# Patient Record
Sex: FEMALE | Race: WHITE | Employment: OTHER | URBAN - METROPOLITAN AREA
[De-identification: names, ages, dates, MRNs, and addresses within clinical notes are randomized per-mention and may not be internally consistent; named-entity substitution may affect disease eponyms.]

---

## 2017-03-29 PROBLEM — D63.8 ANEMIA OF CHRONIC DISEASE: Status: ACTIVE | Noted: 2017-03-29

## 2017-03-29 PROBLEM — Z09 FOLLOW-UP EXAMINATION: Status: ACTIVE | Noted: 2017-03-29

## 2018-06-07 ENCOUNTER — HOSPITAL ENCOUNTER (OUTPATIENT)
Dept: WOMENS IMAGING | Age: 78
Discharge: HOME | End: 2018-06-07
Attending: SURGERY | Admitting: SURGERY

## 2018-06-07 DIAGNOSIS — Z12.31 VISIT FOR SCREENING MAMMOGRAM: ICD-10-CM

## 2018-09-26 PROBLEM — Z09 FOLLOW-UP EXAMINATION: Status: RESOLVED | Noted: 2017-03-29 | Resolved: 2018-09-26

## 2019-12-03 ENCOUNTER — OFFICE VISIT (OUTPATIENT)
Dept: CARDIOLOGY CLINIC | Age: 79
End: 2019-12-03
Payer: MEDICARE

## 2019-12-03 VITALS
SYSTOLIC BLOOD PRESSURE: 135 MMHG | WEIGHT: 182.4 LBS | DIASTOLIC BLOOD PRESSURE: 80 MMHG | HEART RATE: 82 BPM | BODY MASS INDEX: 31.14 KG/M2 | HEIGHT: 64 IN | OXYGEN SATURATION: 97 %

## 2019-12-03 DIAGNOSIS — I25.119 CORONARY ARTERY DISEASE INVOLVING NATIVE CORONARY ARTERY OF NATIVE HEART WITH ANGINA PECTORIS (HCC): Primary | ICD-10-CM

## 2019-12-03 DIAGNOSIS — I50.22 CHRONIC SYSTOLIC CONGESTIVE HEART FAILURE (HCC): ICD-10-CM

## 2019-12-03 DIAGNOSIS — E78.00 PURE HYPERCHOLESTEROLEMIA: ICD-10-CM

## 2019-12-03 DIAGNOSIS — R07.89 CHEST PRESSURE: ICD-10-CM

## 2019-12-03 DIAGNOSIS — I42.9 CARDIOMYOPATHY, UNSPECIFIED TYPE (HCC): ICD-10-CM

## 2019-12-03 PROCEDURE — G8484 FLU IMMUNIZE NO ADMIN: HCPCS | Performed by: INTERNAL MEDICINE

## 2019-12-03 PROCEDURE — G8599 NO ASA/ANTIPLAT THER USE RNG: HCPCS | Performed by: INTERNAL MEDICINE

## 2019-12-03 PROCEDURE — G8400 PT W/DXA NO RESULTS DOC: HCPCS | Performed by: INTERNAL MEDICINE

## 2019-12-03 PROCEDURE — 1090F PRES/ABSN URINE INCON ASSESS: CPT | Performed by: INTERNAL MEDICINE

## 2019-12-03 PROCEDURE — G8417 CALC BMI ABV UP PARAM F/U: HCPCS | Performed by: INTERNAL MEDICINE

## 2019-12-03 PROCEDURE — 99203 OFFICE O/P NEW LOW 30 MIN: CPT | Performed by: INTERNAL MEDICINE

## 2019-12-03 PROCEDURE — 4040F PNEUMOC VAC/ADMIN/RCVD: CPT | Performed by: INTERNAL MEDICINE

## 2019-12-03 PROCEDURE — 93000 ELECTROCARDIOGRAM COMPLETE: CPT | Performed by: INTERNAL MEDICINE

## 2019-12-03 PROCEDURE — 1036F TOBACCO NON-USER: CPT | Performed by: INTERNAL MEDICINE

## 2019-12-03 PROCEDURE — 1123F ACP DISCUSS/DSCN MKR DOCD: CPT | Performed by: INTERNAL MEDICINE

## 2019-12-03 PROCEDURE — G8427 DOCREV CUR MEDS BY ELIG CLIN: HCPCS | Performed by: INTERNAL MEDICINE

## 2019-12-03 RX ORDER — OMEPRAZOLE 20 MG/1
20 CAPSULE, DELAYED RELEASE ORAL DAILY
COMMUNITY

## 2019-12-03 RX ORDER — OLMESARTAN MEDOXOMIL AND HYDROCHLOROTHIAZIDE 40/12.5 40; 12.5 MG/1; MG/1
1 TABLET ORAL DAILY
COMMUNITY

## 2019-12-03 SDOH — HEALTH STABILITY: MENTAL HEALTH: HOW OFTEN DO YOU HAVE A DRINK CONTAINING ALCOHOL?: NEVER

## 2019-12-03 ASSESSMENT — ENCOUNTER SYMPTOMS
SHORTNESS OF BREATH: 1
NAUSEA: 0
EYE REDNESS: 0
COUGH: 0
BLOOD IN STOOL: 0
WHEEZING: 0

## 2019-12-13 ENCOUNTER — TELEPHONE (OUTPATIENT)
Dept: CARDIOLOGY CLINIC | Age: 79
End: 2019-12-13

## 2020-01-08 ENCOUNTER — OFFICE VISIT (OUTPATIENT)
Dept: CARDIOLOGY CLINIC | Age: 80
End: 2020-01-08
Payer: COMMERCIAL

## 2020-01-08 VITALS
DIASTOLIC BLOOD PRESSURE: 80 MMHG | HEIGHT: 64 IN | HEART RATE: 80 BPM | WEIGHT: 191.6 LBS | SYSTOLIC BLOOD PRESSURE: 130 MMHG | BODY MASS INDEX: 32.71 KG/M2 | OXYGEN SATURATION: 98 %

## 2020-01-08 PROCEDURE — 99214 OFFICE O/P EST MOD 30 MIN: CPT | Performed by: INTERNAL MEDICINE

## 2020-01-08 RX ORDER — ATORVASTATIN CALCIUM 40 MG/1
40 TABLET, FILM COATED ORAL DAILY
Qty: 90 TABLET | Refills: 3 | Status: SHIPPED | OUTPATIENT
Start: 2020-01-08 | End: 2020-01-21 | Stop reason: SINTOL

## 2020-01-08 RX ORDER — ASPIRIN 81 MG/1
81 TABLET ORAL DAILY
Qty: 90 TABLET | Refills: 1 | Status: CANCELLED
Start: 2020-01-08

## 2020-01-08 ASSESSMENT — ENCOUNTER SYMPTOMS
WHEEZING: 0
EYE REDNESS: 0
COUGH: 0
SHORTNESS OF BREATH: 0
BLOOD IN STOOL: 0
NAUSEA: 0

## 2020-01-08 NOTE — PATIENT INSTRUCTIONS
Patient Education      START ATORVASTATIN 40MG NIGHTLY  CHECK WITH RHEUMATOLOGIST TO SEE IF YOU CAN TAKE ASPIRIN 81MG DAILY  High Cholesterol: Care Instructions  Your Care Instructions    Cholesterol is a type of fat in your blood. It is needed for many body functions, such as making new cells. Cholesterol is made by your body. It also comes from food you eat. High cholesterol means that you have too much of the fat in your blood. This raises your risk of a heart attack and stroke. LDL and HDL are part of your total cholesterol. LDL is the \"bad\" cholesterol. High LDL can raise your risk for heart disease, heart attack, and stroke. HDL is the \"good\" cholesterol. It helps clear bad cholesterol from the body. High HDL is linked with a lower risk of heart disease, heart attack, and stroke. Your cholesterol levels help your doctor find out your risk for having a heart attack or stroke. You and your doctor can talk about whether you need to lower your risk and what treatment is best for you. A heart-healthy lifestyle along with medicines can help lower your cholesterol and your risk. The way you choose to lower your risk will depend on how high your risk is for heart attack and stroke. It will also depend on how you feel about taking medicines. Follow-up care is a key part of your treatment and safety. Be sure to make and go to all appointments, and call your doctor if you are having problems. It's also a good idea to know your test results and keep a list of the medicines you take. How can you care for yourself at home? · Eat a variety of foods every day. Good choices include fruits, vegetables, whole grains (like oatmeal), dried beans and peas, nuts and seeds, soy products (like tofu), and fat-free or low-fat dairy products. · Replace butter, margarine, and hydrogenated or partially hydrogenated oils with olive and canola oils.  (Canola oil margarine without trans fat is fine.)  · Replace red meat with fish,

## 2020-01-08 NOTE — LETTER
[address] [facility] [place] [address] [address]  [address]  Phone: [phone]  Fax: [phone]    Fernando Brooks MD        January 24, 2020     Amber Díaz, [address] [place] [address]    Patient: Lukas Erwin  MR Number: <[id number]>  YOB: 1940  Date of Visit: 1/8/2020    Dear Dr. Amber Díaz:    Thank you for your referral. Progress note attached in visit summary. If you have questions, please do not hesitate to call me. I look forward to following Smita Arvizu along with you.     Sincerely,        Fernando Brooks MD

## 2020-01-08 NOTE — PROGRESS NOTES
Manuelito Ledezma is a 78 y.o. female    Reason for Visit: f/u stress test    HPI Manuelito Ledezma denies any change in her chronic angina described as a pressure, palpitations, syncope, worsening BOSS, worsening leg swelling, and PND. Her weight is up 9 pounds from last visit which she attributes to eating too much over the holidays. She ambulates with a cane. Review of Systems   Constitutional: Negative for chills, diaphoresis, fatigue and fever. HENT: Negative for congestion, nosebleeds and tinnitus. Eyes: Negative for redness. Respiratory: Negative for cough, shortness of breath and wheezing. Cardiovascular: Negative for chest pain, palpitations and leg swelling. Gastrointestinal: Negative for blood in stool and nausea. Genitourinary: Negative for dysuria and hematuria. Musculoskeletal: Negative for myalgias and neck pain. Neurological: Negative for dizziness. Hematological: Does not bruise/bleed easily. Physical Exam  Vitals signs and nursing note reviewed. Constitutional:       Appearance: She is well-developed. HENT:      Head: Normocephalic and atraumatic. Eyes:      Conjunctiva/sclera: Conjunctivae normal.   Neck:      Musculoskeletal: Normal range of motion and neck supple. Cardiovascular:      Rate and Rhythm: Normal rate and regular rhythm. Heart sounds: S1 normal and S2 normal. Murmur (2/6 systolic) present. No gallop. Pulmonary:      Effort: Pulmonary effort is normal.      Breath sounds: Normal breath sounds. Abdominal:      General: Bowel sounds are normal.      Palpations: Abdomen is soft. Musculoskeletal: Normal range of motion. General: No swelling. Skin:     General: Skin is warm and dry. Neurological:      Mental Status: She is alert and oriented to person, place, and time.    Psychiatric:         Behavior: Behavior normal.       Wt Readings from Last 3 Encounters:   01/08/20 191 lb 9.6 oz (86.9 kg)   12/03/19 182 lb 6.4 oz (82.7 kg) Anemia. H/H 10.7, 29.0.     *   HLD. Not taking statin   12/2019. *   Chest pressure/BOSS. Pt reports chronic exertional angina. Worsening dyspnea make be angina equivalent. *   Valvular heart disease. See echo above. Plan  Stress test showed no ischemia. Stable angina. CHF appears to be compensated. HTN controlled. Continue ARB, HCTZ, nitrates, norvasc and BB. Will start atorvastatin 40mg nightly. Recommend ASA 81mg daily if OK with rheumatologist. Fasting lipid profile, CMP, and proBNP in 3 months. Consider angiogram is symptoms worsen. Return in about 3 months (around 4/8/2020). This note was scribed in the presence of the physician by Pietro Walker RN. The scribes documentation has been prepared under my direction and personally reviewed by me in its entirety. I confirm that the note above accurately reflects all work, treatment, procedures, and medical decision making performed by me. The scribes documentation has been prepared under my direction and personally reviewed by me in its entirety. I confirm that the note above accurately reflects all work, treatment, procedures, and medical decision making performed by me.

## 2020-01-21 ENCOUNTER — TELEPHONE (OUTPATIENT)
Dept: CARDIOLOGY CLINIC | Age: 80
End: 2020-01-21

## 2020-01-21 RX ORDER — ROSUVASTATIN CALCIUM 10 MG/1
10 TABLET, COATED ORAL DAILY
Qty: 90 TABLET | Refills: 3 | Status: SHIPPED | OUTPATIENT
Start: 2020-01-21

## 2020-01-21 RX ORDER — ROSUVASTATIN CALCIUM 10 MG/1
10 TABLET, COATED ORAL DAILY
COMMUNITY
End: 2020-01-21 | Stop reason: SDUPTHER

## 2020-01-21 NOTE — TELEPHONE ENCOUNTER
Patient states she cannot take Atorvastatin for it made her itch and break out in a rash all over her belly. She also had joint aches. Dr. Alannah Diaz stopped the medication and its getting better. She denies chest pain or sob. She wants to know if she should be started on a different mediation.  Please advise at [phone]

## 2020-01-21 NOTE — TELEPHONE ENCOUNTER
Pt notified and verbalized understanding. lipitor taken off med list and crestor 10mg added. Med sent to pharm.

## 2020-05-29 NOTE — PROGRESS NOTES
well-developed and well-nourished. In no acute distress. Utilizes a cane. Head: Normocephalic and atraumatic. Pupils equal and round. Neck: Neck supple. No JVP or carotid bruit appreciated. No mass and no thyromegaly present. No lymphadenopathy present. Cardiovascular: Normal rate. Normal heart sounds. Exam reveals no gallop and no friction rub. II/VI KEON  Pulmonary/Chest: Effort normal and breath sounds normal. No respiratory distress. No wheezes, rhonchi or rales. Abdominal: Soft, non-tender. Bowel sounds are normal. Exhibits no organomegaly, mass or bruit. Extremities: Trace BLE edema. No cyanosis or clubbing. Pulses are 2+ radial and carotid bilaterally. Neurological: No gross cranial nerve deficit. Coordination normal.   Skin: Skin is warm and dry. There is no rash or diaphoresis. Psychiatric: Patient has a normal mood and affect.  Speech is normal and behavior is normal.       Wt Readings from Last 3 Encounters:   06/01/20 196 lb 9.6 oz (89.2 kg)   01/08/20 191 lb 9.6 oz (86.9 kg)   12/03/19 182 lb 6.4 oz (82.7 kg)     BP Readings from Last 3 Encounters:   06/01/20 122/76   01/08/20 130/80   12/03/19 135/80     Pulse Readings from Last 3 Encounters:   06/01/20 68   01/08/20 80   12/03/19 82         Current Outpatient Medications:     rosuvastatin (CRESTOR) 10 MG tablet, Take 1 tablet by mouth daily, Disp: 90 tablet, Rfl: 3    Multiple Vitamins-Minerals (VITAMIN D3 COMPLETE PO), Take by mouth, Disp: , Rfl:     omeprazole (PRILOSEC) 20 MG delayed release capsule, Take 20 mg by mouth daily, Disp: , Rfl:     olmesartan-hydrochlorothiazide (BENICAR HCT) 40-12.5 MG per tablet, Take 1 tablet by mouth daily, Disp: , Rfl:     etanercept (ENBREL) 50 MG/ML injection, Inject 50 mg into the skin once , Disp: , Rfl:     isosorbide mononitrate (IMDUR) 60 MG extended release tablet, Take 60 mg by mouth daily, Disp: , Rfl:     amLODIPine (NORVASC) 5 MG tablet, Take 5 mg by mouth daily, Disp: , Rfl:    metoprolol succinate (TOPROL XL) 50 MG extended release tablet, Take 50 mg by mouth daily, Disp: , Rfl:     methotrexate (RHEUMATREX) 2.5 MG chemo tablet, Take by mouth once a week, Disp: , Rfl:     celecoxib (CELEBREX) 200 MG capsule, Take 200 mg by mouth 2 times daily, Disp: , Rfl:     folic acid (FOLVITE) 1 MG tablet, Take 1 mg by mouth daily, Disp: , Rfl:     Past Medical History:   Diagnosis Date    Breast cancer ([facility]     CAD (coronary artery disease)     Cath at [facility]  mild CAD 20% RCA and 20% LAD, LVEF normal.     Cardiomyopathy ([facility]     Echo 2019 LVEF 35%, LVH, grade I DD, mild AI, mild-mod MR, mild TR.     HTN (hypertension)     LBBB (left bundle branch block)     Spinal stenosis        Social History     Socioeconomic History    Marital status:      Spouse name: None    Number of children: None    Years of education: None    Highest education level: None   Occupational History    None   Social Needs    Financial resource strain: None    Food insecurity     Worry: None     Inability: None    Transportation needs     Medical: None     Non-medical: None   Tobacco Use    Smoking status: Former Smoker     Packs/day: 1.00     Years: 15.00     Pack years: 15.00     Types: Cigarettes     Last attempt to quit: 12/3/1979     Years since quittin.5    Smokeless tobacco: Never Used   Substance and Sexual Activity    Alcohol use: Never     Frequency: Never    Drug use: Never    Sexual activity: None   Lifestyle    Physical activity     Days per week: None     Minutes per session: None    Stress: None   Relationships    Social connections     Talks on phone: None     Gets together: None     Attends Yazidism service: None     Active member of club or organization: None     Attends meetings of clubs or organizations: None     Relationship status: None    Intimate partner violence     Fear of current or ex partner: None     Emotionally abused: None     Physically abused: None Forced sexual activity: None   Other Topics Concern    None   Social History Narrative    None       Past Surgical History:   Procedure Laterality Date    BACK SURGERY      HYSTERECTOMY, TOTAL ABDOMINAL      TOE SURGERY         Family History   Problem Relation Age of Onset    Stroke Mother     Heart Attack Father        No Known Allergies    Recent Labs and Imaging reviewed    Assessment     Breast cancer ([facility]- Hx     CAD (coronary artery disease)     --cath at [facility] 2014 mild CAD 20% RCA and 20% LAD, LVEF normal. Being treated for chronic microvascular angina. EKG 12/3/19 NSR, PAC's, LBBB. Nuc GXT 12/2019 hypokinesis with septal dyskinesia, LVEF 41%, no perfusion defect. Not taking ASA per rheumatologist.     HTN (hypertension)- controlled.  LBBB (left bundle branch block)- chronic     Spinal stenosis. Hx      *   Rheumatoid arthritis. Hx    *   CM/Chronic systolic HF. Echo 11/2019 LVH, LVEF 35%, wall motion abnormality, grade I DD, mild AI, mild-mod MR, mild TR. ProBNP 1500 12/2019. *   Anemia. H/H 10.7, 29.0.     *   HLD.  12/2019. Unable to tolerate Lipitor with complaints of itching/rash     *   Chest pressure/BOSS. Pt reports chronic exertional angina. Worsening dyspnea make be angina equivalent. *   Valvular heart disease. See echo above. Plan  Patient appears stable from a cardiac standpoint. She appears compensated and denies any weight gain or worsening shortness of breath. Her blood pressure is well controlled and will continue current medical therapy. She reported an intolerance to Lipitor with complaints of a rash but currently tolerating Crestor 10 mg daily. Will repeat lipid and CMP prior to follow up and if uncontrolled will increase the Crestor. Follow up in 6-7 months       This note was scribed in the presence of Dr Masood Sandhu MD by Natalia Castaneda RN.      The scribes documentation has been prepared under my direction and personally reviewed by me in its entirety. I confirm that the note above accurately reflects all work, treatment, procedures, and medical decision making performed by me.

## 2020-06-01 ENCOUNTER — OFFICE VISIT (OUTPATIENT)
Dept: CARDIOLOGY CLINIC | Age: 80
End: 2020-06-01
Payer: COMMERCIAL

## 2020-06-01 VITALS
OXYGEN SATURATION: 98 % | HEIGHT: 64 IN | SYSTOLIC BLOOD PRESSURE: 122 MMHG | DIASTOLIC BLOOD PRESSURE: 76 MMHG | HEART RATE: 68 BPM | WEIGHT: 196.6 LBS | TEMPERATURE: 98.1 F | BODY MASS INDEX: 33.57 KG/M2

## 2020-06-01 PROCEDURE — 99214 OFFICE O/P EST MOD 30 MIN: CPT | Performed by: INTERNAL MEDICINE

## 2020-06-01 RX ORDER — AMLODIPINE BESYLATE 5 MG/1
5 TABLET ORAL DAILY
Qty: 90 TABLET | Refills: 3 | Status: SHIPPED | OUTPATIENT
Start: 2020-06-01

## 2020-06-01 RX ORDER — ISOSORBIDE MONONITRATE 60 MG/1
60 TABLET, EXTENDED RELEASE ORAL DAILY
Qty: 90 TABLET | Refills: 3 | Status: SHIPPED | OUTPATIENT
Start: 2020-06-01 | End: 2020-06-15

## 2020-06-01 RX ORDER — METOPROLOL SUCCINATE 50 MG/1
50 TABLET, EXTENDED RELEASE ORAL DAILY
Qty: 90 TABLET | Refills: 3 | Status: SHIPPED | OUTPATIENT
Start: 2020-06-01

## 2020-06-01 NOTE — PATIENT INSTRUCTIONS
Patient Education        High Blood Pressure: Care Instructions  Overview     It's normal for blood pressure to go up and down throughout the day. But if it stays up, you have high blood pressure. Another name for high blood pressure is hypertension. Despite what a lot of people think, high blood pressure usually doesn't cause headaches or make you feel dizzy or lightheaded. It usually has no symptoms. But it does increase your risk of stroke, heart attack, and other problems. You and your doctor will talk about your risks of these problems based on your blood pressure. Your doctor will give you a goal for your blood pressure. Your goal will be based on your health and your age. Lifestyle changes, such as eating healthy and being active, are always important to help lower blood pressure. You might also take medicine to reach your blood pressure goal.  Follow-up care is a key part of your treatment and safety. Be sure to make and go to all appointments, and call your doctor if you are having problems. It's also a good idea to know your test results and keep a list of the medicines you take. How can you care for yourself at home? Medical treatment  · If you stop taking your medicine, your blood pressure will go back up. You may take one or more types of medicine to lower your blood pressure. Be safe with medicines. Take your medicine exactly as prescribed. Call your doctor if you think you are having a problem with your medicine. · Talk to your doctor before you start taking aspirin every day. Aspirin can help certain people lower their risk of a heart attack or stroke. But taking aspirin isn't right for everyone, because it can cause serious bleeding. · See your doctor regularly. You may need to see the doctor more often at first or until your blood pressure comes down.   · If you are taking blood pressure medicine, talk to your doctor before you take decongestants or anti-inflammatory medicine, such as ibuprofen. Some of these medicines can raise blood pressure. · Learn how to check your blood pressure at home. Lifestyle changes  · Stay at a healthy weight. This is especially important if you put on weight around the waist. Losing even 10 pounds can help you lower your blood pressure. · If your doctor recommends it, get more exercise. Walking is a good choice. Bit by bit, increase the amount you walk every day. Try for at least 30 minutes on most days of the week. You also may want to swim, bike, or do other activities. · Avoid or limit alcohol. Talk to your doctor about whether you can drink any alcohol. · Try to limit how much sodium you eat to less than 2,300 milligrams (mg) a day. Your doctor may ask you to try to eat less than 1,500 mg a day. · Eat plenty of fruits (such as bananas and oranges), vegetables, legumes, whole grains, and low-fat dairy products. · Lower the amount of saturated fat in your diet. Saturated fat is found in animal products such as milk, cheese, and meat. Limiting these foods may help you lose weight and also lower your risk for heart disease. · Do not smoke. Smoking increases your risk for heart attack and stroke. If you need help quitting, talk to your doctor about stop-smoking programs and medicines. These can increase your chances of quitting for good. When should you call for help? Call  911 anytime you think you may need emergency care. This may mean having symptoms that suggest that your blood pressure is causing a serious heart or blood vessel problem. Your blood pressure may be over 180/120. For example, call 911 if:  · You have symptoms of a heart attack. These may include:  ? Chest pain or pressure, or a strange feeling in the chest.  ? Sweating. ? Shortness of breath. ? Nausea or vomiting. ? Pain, pressure, or a strange feeling in the back, neck, jaw, or upper belly or in one or both shoulders or arms. ? Lightheadedness or sudden weakness.   ? A fast or

## 2020-06-15 RX ORDER — ISOSORBIDE MONONITRATE 60 MG/1
60 TABLET, EXTENDED RELEASE ORAL 2 TIMES DAILY
Qty: 180 TABLET | Refills: 3 | Status: SHIPPED | OUTPATIENT
Start: 2020-06-15

## 2020-06-15 RX ORDER — ISOSORBIDE MONONITRATE 60 MG/1
60 TABLET, EXTENDED RELEASE ORAL 2 TIMES DAILY
COMMUNITY
End: 2020-06-15 | Stop reason: SDUPTHER

## 2020-11-20 PROBLEM — E78.00 PURE HYPERCHOLESTEROLEMIA: Status: ACTIVE | Noted: 2020-11-20

## 2020-11-20 PROBLEM — I50.22 CHRONIC SYSTOLIC CONGESTIVE HEART FAILURE (HCC): Status: ACTIVE | Noted: 2020-11-20

## 2020-11-20 NOTE — PROGRESS NOTES
Bre Quiroz is a [de-identified] y.o. female    Reason for Visit: f/u CAD, angina                       HPI Bre Quiroz presents today for a follow up. Today, the patient denies exertional chest pain, palpitations, dizziness, syncope, worsening leg swelling and worsening dyspnea. She ambulates with a cane. She had 2 episodes of sharp pain in her chest after taking a deep breath which lasted only briefly. She is chronically fatigued. Review of Systems:  · Constitutional: No unanticipated weight loss. There's been no change in sleep pattern. No fevers, chills. · Eyes: No visual changes or diplopia. No scleral icterus. · ENT: No Headaches, hearing loss or vertigo. No mouth sores or sore throat. · Cardiovascular: as reviewed in HPI  · Respiratory: No cough or wheezing, no sputum production. No hemoptysis. · Gastrointestinal: No abdominal pain, appetite loss, blood in stools. No change in bowel or bladder habits. · Genitourinary: No dysuria, trouble voiding, or hematuria. · Musculoskeletal:  No gait disturbance, no joint complaints. · Integumentary: No rash or pruritis. · Neurological: No headache, diplopia, change in muscle strength, numbness or tingling. · Psychiatric: No anxiety or depression. · Endocrine: No temperature intolerance. No excessive thirst, fluid intake, or urination. No tremor. · Hematologic/Lymphatic: No abnormal bruising or bleeding, blood clots or swollen lymph nodes. · Allergic/Immunologic: No nasal congestion or hives. Physical Exam:   Constitutional: The patient is oriented to person, place, and time. Appears obese, well-developed and well-nourished. In no acute distress. Head: Normocephalic and atraumatic. Pupils equal and round. Neck: Neck supple. No JVP or carotid bruit appreciated. No mass and no thyromegaly present. No lymphadenopathy present. Cardiovascular: Normal rate. Normal heart sounds. Exam reveals no gallop and no friction rub.  2/6 systolic murmur heard.  Pulmonary/Chest: Effort normal. Few crackles left base. No respiratory distress. No wheezes or rhonchi. Abdominal: Soft, non-tender. Bowel sounds are normal. Exhibits no organomegaly, mass or bruit. Extremities: 2+ BLE edema. No cyanosis or clubbing. Pulses are 2+ radial and carotid bilaterally. Neurological: No gross cranial nerve deficit. Coordination normal.   Skin: Skin is warm and dry. There is no rash or diaphoresis. Psychiatric: Patient has a normal mood and affect.  Speech is normal and behavior is normal.       Wt Readings from Last 3 Encounters:   12/03/20 194 lb 6.4 oz (88.2 kg)   06/01/20 196 lb 9.6 oz (89.2 kg)   01/08/20 191 lb 9.6 oz (86.9 kg)     BP Readings from Last 3 Encounters:   12/03/20 110/62   06/01/20 122/76   01/08/20 130/80     Pulse Readings from Last 3 Encounters:   12/03/20 70   06/01/20 68   01/08/20 80       Current Outpatient Medications:     isosorbide mononitrate (IMDUR) 60 MG extended release tablet, Take 1 tablet by mouth 2 times daily, Disp: 180 tablet, Rfl: 3    metoprolol succinate (TOPROL XL) 50 MG extended release tablet, Take 1 tablet by mouth daily, Disp: 90 tablet, Rfl: 3    amLODIPine (NORVASC) 5 MG tablet, Take 1 tablet by mouth daily, Disp: 90 tablet, Rfl: 3    rosuvastatin (CRESTOR) 10 MG tablet, Take 1 tablet by mouth daily, Disp: 90 tablet, Rfl: 3    Multiple Vitamins-Minerals (VITAMIN D3 COMPLETE PO), Take by mouth, Disp: , Rfl:     omeprazole (PRILOSEC) 20 MG delayed release capsule, Take 20 mg by mouth daily, Disp: , Rfl:     olmesartan-hydrochlorothiazide (BENICAR HCT) 40-12.5 MG per tablet, Take 1 tablet by mouth daily, Disp: , Rfl:     etanercept (ENBREL) 50 MG/ML injection, Inject 50 mg into the skin once , Disp: , Rfl:     methotrexate (RHEUMATREX) 2.5 MG chemo tablet, Take by mouth once a week, Disp: , Rfl:     celecoxib (CELEBREX) 200 MG capsule, Take 200 mg by mouth 2 times daily, Disp: , Rfl:     folic acid (FOLVITE) 1 MG tablet, Take 1 mg by mouth daily, Disp: , Rfl:     Past Medical History:   Diagnosis Date    Breast cancer ([facility]     CAD (coronary artery disease)     Cath at [facility]  mild CAD 20% RCA and 20% LAD, LVEF normal.     Cardiomyopathy ([facility]     Echo 2019 LVEF 35%, LVH, grade I DD, mild AI, mild-mod MR, mild TR.      Chronic systolic congestive heart failure ([place] [address] 2020    HTN (hypertension)     LBBB (left bundle branch block)     Pure hypercholesterolemia 2020    Spinal stenosis        Social History     Socioeconomic History    Marital status:      Spouse name: None    Number of children: None    Years of education: None    Highest education level: None   Occupational History    None   Social Needs    Financial resource strain: None    Food insecurity     Worry: None     Inability: None    Transportation needs     Medical: None     Non-medical: None   Tobacco Use    Smoking status: Former Smoker     Packs/day: 1.00     Years: 15.00     Pack years: 15.00     Types: Cigarettes     Last attempt to quit: 12/3/1979     Years since quittin.0    Smokeless tobacco: Never Used   Substance and Sexual Activity    Alcohol use: Never     Frequency: Never    Drug use: Never    Sexual activity: None   Lifestyle    Physical activity     Days per week: None     Minutes per session: None    Stress: None   Relationships    Social connections     Talks on phone: None     Gets together: None     Attends Yazdanism service: None     Active member of club or organization: None     Attends meetings of clubs or organizations: None     Relationship status: None    Intimate partner violence     Fear of current or ex partner: None     Emotionally abused: None     Physically abused: None     Forced sexual activity: None   Other Topics Concern    None   Social History Narrative    None       Past Surgical History:   Procedure Laterality Date    BACK SURGERY      HYSTERECTOMY, TOTAL ABDOMINAL      TOE SURGERY         Family History   Problem Relation Age of Onset    Stroke Mother     Heart Attack Father        No Known Allergies    Recent Labs and Imaging reviewed    Assessment     CAD (coronary artery disease)     --cath at [facility] 2014 mild CAD 20% RCA and 20% LAD, LVEF normal. Being treated for chronic microvascular angina. EKG 12/3/19 NSR, PAC's, LBBB. Nuc GXT 12/2019 hypokinesis with septal dyskinesia, LVEF 41%, no perfusion defect. Not taking ASA per rheumatologist.       HTN (hypertension)- controlled.  LBBB (left bundle branch block)- chronic.  Spinal stenosis. Hx        *  Rheumatoid arthritis. Hx      *   CM/Chronic systolic HF. Echo 11/2019 LVH, LVEF 35%, wall motion abnormality, grade I DD, mild AI, mild-mod MR, mild TR. ProBNP 1500 12/2019. *   Anemia. H/H 10.9, 33.6 8/2020. Managed by PCP. *   HLD.  12/2019. Unable to tolerate Lipitor with complaints of itching/rash. Tolerating crestor. *   Chest pressure/BOSS. Pt reports chronic exertional angina. Worsening dyspnea make be angina equivalent. *  Valvular heart disease. See echo above. * Breast cancer. Hx     Plan  Stable angina. CHF appears to be compensated. Continue nitrates, BB, statin. Fasting lipid profile, CMP, proBNP before next visit. Follow up in 6-7 months     This note was scribed in the presence of the physician by Dariel Cabrera RN. The scribes documentation has been prepared under my direction and personally reviewed by me in its entirety. I confirm that the note above accurately reflects all work, treatment, procedures, and medical decision making performed by me.

## 2020-12-03 ENCOUNTER — OFFICE VISIT (OUTPATIENT)
Dept: CARDIOLOGY CLINIC | Age: 80
End: 2020-12-03
Payer: COMMERCIAL

## 2020-12-03 VITALS
BODY MASS INDEX: 33.19 KG/M2 | HEART RATE: 70 BPM | WEIGHT: 194.4 LBS | TEMPERATURE: 97.5 F | OXYGEN SATURATION: 98 % | DIASTOLIC BLOOD PRESSURE: 62 MMHG | HEIGHT: 64 IN | SYSTOLIC BLOOD PRESSURE: 110 MMHG

## 2020-12-03 PROCEDURE — 99214 OFFICE O/P EST MOD 30 MIN: CPT | Performed by: INTERNAL MEDICINE

## 2020-12-03 NOTE — LETTER
[address] Cardiology - [facility] 153  [address]  Phone: [phone]  Fax: [phone]    Lupis Crockett MD        December 8, 2020     Olga Schreiber MD  [facility] [address] [facility]    Patient: Maura Chairez  MR Number: <[id number]>  YOB: 1940  Date of Visit: 12/3/2020    Dear Dr. Olga Schreiber:    Thank you for your referral. Progress note attached in visit summary. If you have questions, please do not hesitate to call me. I look forward to following Kedar Juani along with you.     Sincerely,        Lupis Crockett MD

## 2020-12-03 NOTE — LETTER
[address] Cardiology - [place] 153  [address]  Phone: [phone]  Fax: [phone]    Edgar Jauregui MD        December 8, 2020     Lázaro Barker MD  [facility] [address] [facility]    Patient: John Delgado  MR Number: <[id number]>  YOB: 1940  Date of Visit: 12/3/2020    Dear Dr. Lázaro Barker:    Thank you for your referral. Progress note attached in visit summary. If you have questions, please do not hesitate to call me. I look forward to following Sandra Renteria along with you.     Sincerely,        Edgar Jauregui MD

## 2020-12-03 NOTE — PATIENT INSTRUCTIONS
Patient Education        Learning About Coronary Artery Disease (CAD)  What is coronary artery disease? Coronary artery disease (CAD) occurs when plaque builds up in the arteries that bring oxygen-rich blood to your heart. Plaque is a fatty substance made of cholesterol, calcium, and other substances in the blood. This process is called hardening of the arteries, or atherosclerosis. What happens when you have coronary artery disease? · Plaque may narrow the coronary arteries. Narrowed arteries cause poor blood flow. This can lead to angina symptoms such as chest pain or discomfort. If blood flow is completely blocked, you could have a heart attack. · You can slow CAD and reduce the risk of future problems by making changes in your lifestyle. These include quitting smoking and eating heart-healthy foods. · Treatments for CAD, along with changes in your lifestyle, can help you live a longer and healthier life. How can you prevent coronary artery disease? · Do not smoke. It may be the best thing you can do to prevent heart disease. If you need help quitting, talk to your doctor about stop-smoking programs and medicines. These can increase your chances of quitting for good. · Be active. Get at least 30 minutes of exercise on most days of the week. Walking is a good choice. You also may want to do other activities, such as running, swimming, cycling, or playing tennis or team sports. · Eat heart-healthy foods. Eat more fruits and vegetables and less foods that contain saturated and trans fats. Limit alcohol, sodium, and sweets. · Stay at a healthy weight. Lose weight if you need to. · Manage other health problems such as diabetes, high blood pressure, and high cholesterol. How is coronary artery disease treated? · Your doctor will suggest that you make lifestyle changes. For example, your doctor may ask you to eat healthy foods, quit smoking, lose extra weight, and be more active.   · You will have to take medicines. · Your doctor may suggest a procedure to open narrowed or blocked arteries. This is called angioplasty. Or your doctor may suggest using healthy blood vessels to create detours around narrowed or blocked arteries. This is called bypass surgery. Follow-up care is a key part of your treatment and safety. Be sure to make and go to all appointments, and call your doctor if you are having problems. It's also a good idea to know your test results and keep a list of the medicines you take. Where can you learn more? Go to https://[employer]Roomlr.SiteWit. org and sign in to your [employer] account. Enter ([phone] in the [employer] box to learn more about \"Learning About Coronary Artery Disease (CAD). \"     If you do not have an account, please click on the \"Sign Up Now\" link. Current as of: December 16, 2019               Content Version: 12.6  © 2183-3170 [employer]. Care instructions adapted under license by [facility] ([facility]). If you have questions about a medical condition or this instruction, always ask your healthcare professional. [place] [address] any warranty or liability for your use of this information.

## 2021-01-12 RX ORDER — ROSUVASTATIN CALCIUM 10 MG/1
10 TABLET, COATED ORAL DAILY
Qty: 90 TABLET | Refills: 3 | OUTPATIENT
Start: 2021-01-12

## 2021-08-20 RX ORDER — ISOSORBIDE MONONITRATE 60 MG/1
TABLET, EXTENDED RELEASE ORAL
Qty: 180 TABLET | Refills: 3 | OUTPATIENT
Start: 2021-08-20

## 2021-08-27 RX ORDER — ISOSORBIDE MONONITRATE 60 MG/1
TABLET, EXTENDED RELEASE ORAL
Qty: 180 TABLET | Refills: 3 | OUTPATIENT
Start: 2021-08-27
